# Patient Record
Sex: MALE | Race: WHITE | NOT HISPANIC OR LATINO | Employment: FULL TIME | URBAN - METROPOLITAN AREA
[De-identification: names, ages, dates, MRNs, and addresses within clinical notes are randomized per-mention and may not be internally consistent; named-entity substitution may affect disease eponyms.]

---

## 2017-10-03 ENCOUNTER — OFFICE VISIT (OUTPATIENT)
Dept: FAMILY MEDICINE | Facility: CLINIC | Age: 22
End: 2017-10-03
Payer: COMMERCIAL

## 2017-10-03 VITALS
WEIGHT: 204 LBS | SYSTOLIC BLOOD PRESSURE: 112 MMHG | HEART RATE: 64 BPM | HEIGHT: 77 IN | BODY MASS INDEX: 24.09 KG/M2 | TEMPERATURE: 98 F | DIASTOLIC BLOOD PRESSURE: 68 MMHG

## 2017-10-03 DIAGNOSIS — E80.6 HYPERBILIRUBINEMIA: Primary | ICD-10-CM

## 2017-10-03 DIAGNOSIS — Z00.00 ANNUAL PHYSICAL EXAM: ICD-10-CM

## 2017-10-03 DIAGNOSIS — Z13.6 CARDIOVASCULAR SCREENING; LDL GOAL LESS THAN 160: ICD-10-CM

## 2017-10-03 PROCEDURE — 99395 PREV VISIT EST AGE 18-39: CPT | Performed by: FAMILY MEDICINE

## 2017-10-03 NOTE — PROGRESS NOTES
SUBJECTIVE:   CC: Brandon Valdez is an 22 year old male who presents for preventative health visit.     Healthy Habits:    Do you get at least three servings of calcium containing foods daily (dairy, green leafy vegetables, etc.)? yes    Amount of exercise or daily activities, outside of work: 3 day(s) per week    Problems taking medications regularly not applicable    Medication side effects: No    Have you had an eye exam in the past two years? no    Do you see a dentist twice per year? yes    Do you have sleep apnea, excessive snoring or daytime drowsiness?no    History of hyperbilirubinemia. He has been evaluated by hematologist as well as hepatologist. He denies any new symptoms. According to him he had some blood work done recently.            Today's PHQ-2 Score:   PHQ-2 ( 1999 Pfizer) 10/3/2017 9/22/2016   Q1: Little interest or pleasure in doing things 0 0   Q2: Feeling down, depressed or hopeless 0 0   PHQ-2 Score 0 0       Abuse: Current or Past(Physical, Sexual or Emotional)- No  Do you feel safe in your environment - Yes    Social History   Substance Use Topics     Smoking status: Never Smoker     Smokeless tobacco: Never Used     Alcohol use 0.0 oz/week     0 Standard drinks or equivalent per week      Comment: 2-3 / week     The patient does not drink >3 drinks per day nor >7 drinks per week.    Last PSA: No results found for: PSA    Reviewed orders with patient. Reviewed health maintenance and updated orders accordingly - Yes      Reviewed and updated as needed this visit by clinical staff  Tobacco  Allergies  Meds  Soc Hx        Reviewed and updated as needed this visit by Provider            ROS:  C: NEGATIVE for fever, chills, change in weight  I: NEGATIVE for worrisome rashes, moles or lesions  E: NEGATIVE for vision changes or irritation  ENT: NEGATIVE for ear, mouth and throat problems  R: NEGATIVE for significant cough or SOB  CV: NEGATIVE for chest pain, palpitations or peripheral edema  GI:  "NEGATIVE for nausea, abdominal pain, heartburn, or change in bowel habits   male: negative for dysuria, hematuria, decreased urinary stream, erectile dysfunction, urethral discharge  M: NEGATIVE for significant arthralgias or myalgia  N: NEGATIVE for weakness, dizziness or paresthesias  P: NEGATIVE for changes in mood or affect    OBJECTIVE:   /68  Pulse 64  Temp 98  F (36.7  C) (Tympanic)  Ht 6' 4.65\" (1.947 m)  Wt 204 lb (92.5 kg)  BMI 24.41 kg/m2  EXAM:  GENERAL: healthy, alert and no distress  EYES: Eyes grossly normal to inspection, PERRL and conjunctivae and sclerae normal  HENT: ear canals and TM's normal, nose and mouth without ulcers or lesions  NECK: no adenopathy, no asymmetry, masses, or scars and thyroid normal to palpation  RESP: lungs clear to auscultation - no rales, rhonchi or wheezes  CV: regular rate and rhythm, normal S1 S2, no S3 or S4, no murmur, click or rub, no peripheral edema and peripheral pulses strong  ABDOMEN: soft, nontender, no hepatosplenomegaly, no masses and bowel sounds normal  MS: no gross musculoskeletal defects noted, no edema  SKIN: no suspicious lesions or rashes  NEURO: Normal strength and tone, mentation intact and speech normal  PSYCH: mentation appears normal, affect normal/bright    ASSESSMENT/PLAN:   1. Annual physical exam  Discussed with the patient about his hyperbilirubinemia. Discussed with him he should get periodic blood work. Patient denied any blood work at this time.    2. Hyperbilirubinemia  Stable has seen specialists for further evaluation. According to patient so far all the blood work has been normal and he denied any new symptoms    3. CARDIOVASCULAR SCREENING; LDL GOAL LESS THAN 160        COUNSELING:  Reviewed preventive health counseling, as reflected in patient instructions       Regular exercise       Healthy diet/nutrition           reports that he has never smoked. He has never used smokeless tobacco.      Estimated body mass index is " "24.41 kg/(m^2) as calculated from the following:    Height as of this encounter: 6' 4.65\" (1.947 m).    Weight as of this encounter: 204 lb (92.5 kg).         Counseling Resources:  ATP IV Guidelines  Pooled Cohorts Equation Calculator  FRAX Risk Assessment  ICSI Preventive Guidelines  Dietary Guidelines for Americans, 2010  USDA's MyPlate  ASA Prophylaxis  Lung CA Screening    Jamie Kaminski MD  [facility]  "

## 2017-10-03 NOTE — NURSING NOTE
"Chief Complaint   Patient presents with     Physical     Not Fasting        Initial /68  Pulse 64  Temp 98  F (36.7  C) (Tympanic)  Ht 6' 4.65\" (1.947 m)  Wt 204 lb (92.5 kg)  BMI 24.41 kg/m2 Estimated body mass index is 24.41 kg/(m^2) as calculated from the following:    Height as of this encounter: 6' 4.65\" (1.947 m).    Weight as of this encounter: 204 lb (92.5 kg).  Medication Reconciliation: complete    No current outpatient prescriptions on file.       Alberto LANCASTER CMA  "

## 2017-10-03 NOTE — MR AVS SNAPSHOT
After Visit Summary   10/3/2017    Brandon Valdez    MRN: [id number]           Patient Information     Date Of Birth          1995        Visit Information        Provider Department      10/3/2017 3:40 PM Jamie Kaminski MD [facility]        Today's Diagnoses     Hyperbilirubinemia    -  1    Annual physical exam        CARDIOVASCULAR SCREENING; LDL GOAL LESS THAN 160          Care Instructions      Preventive Health Recommendations  Male Ages 18 - 25     Yearly exam:             See your health care provider every year in order to  o   Review health changes.   o   Discuss preventive care.    o   Review your medicines if your doctor has prescribed any.    You should be tested each year for STDs (sexually transmitted diseases).     Talk to your provider about cholesterol testing.      If you are at risk for diabetes, you should have a diabetes test (fasting glucose).    Shots: Get a flu shot each year. Get a tetanus shot every 10 years.     Nutrition:    Eat at least 5 servings of fruits and vegetables daily.     Eat whole-grain bread, whole-wheat pasta and brown rice instead of white grains and rice.     Talk to your provider about calcium and Vitamin D.     Lifestyle    Exercise for at least 150 minutes a week (30 minutes a day, 5 days a week). This will help you control your weight and prevent disease.     Limit alcohol to one drink per day.     No smoking.     Wear sunscreen to prevent skin cancer.     See your dentist every six months for an exam and cleaning.             Follow-ups after your visit        Your next 10 appointments already scheduled     Oct 03, 2017  3:40 PM CDT   PHYSICAL with Jamie Kaminski MD   [facility] ([facility])    [address] [place] [id number] [phone]              Who to contact     If you have questions or need follow up information about today's clinic visit or your schedule please contact  "[facility] [place] directly at [phone].  Normal or non-critical lab and imaging results will be communicated to you by MyChart, letter or phone within 4 business days after the clinic has received the results. If you do not hear from us within 7 days, please contact the clinic through MyChart or phone. If you have a critical or abnormal lab result, we will notify you by phone as soon as possible.  Submit refill requests through [employer] or call your pharmacy and they will forward the refill request to us. Please allow 3 business days for your refill to be completed.          Additional Information About Your Visit        [employer] Information     [employer] gives you secure access to your electronic health record. If you see a primary care provider, you can also send messages to your care team and make appointments. If you have questions, please call your primary care clinic.  If you do not have a primary care provider, please call [phone] and they will assist you.        Care EveryWhere ID     This is your Care EveryWhere ID. This could be used by other organizations to access your [place] medical records  [id number]        Your Vitals Were     Pulse Temperature Height BMI (Body Mass Index)          64 98  F (36.7  C) (Tympanic) 6' 4.65\" (1.947 m) 24.41 kg/m2         Blood Pressure from Last 3 Encounters:   10/03/17 112/68   09/22/16 110/68   12/26/15 136/61    Weight from Last 3 Encounters:   10/03/17 204 lb (92.5 kg)   09/22/16 199 lb (90.3 kg)   12/26/15 197 lb (89.4 kg)              Today, you had the following     No orders found for display       Primary Care Provider Office Phone # Fax #    Jamie Kaminski [phone]        [address]  [place] [address]        Equal Access to Services     [facility] ANTONIO AH: Keron ramoso Sogalina, waaxda luqadaha, qaybta kaalmada adeegyada, waxay shahram acosta. So [facility] [phone].    ATENCIÓN: Si habla " español, tiene a walker disposición servicios gratuitos de asistencia lingüística. Sacha grimaldo [phone].    We comply with applicable federal civil rights laws and [place] laws. We do not discriminate on the basis of race, color, national origin, age, disability, sex, sexual orientation, or gender identity.            Thank you!     Thank you for choosing [facility] [place]  for your care. Our goal is always to provide you with excellent care. Hearing back from our patients is one way we can continue to improve our services. Please take a few minutes to complete the written survey that you may receive in the mail after your visit with us. Thank you!             Your Updated Medication List - Protect others around you: Learn how to safely use, store and throw away your medicines at www.disposemymeds.org.      Notice  As of 10/3/2017  3:32 PM    You have not been prescribed any medications.

## 2018-02-28 ENCOUNTER — OFFICE VISIT (OUTPATIENT)
Dept: FAMILY MEDICINE | Facility: CLINIC | Age: 23
End: 2018-02-28
Payer: COMMERCIAL

## 2018-02-28 VITALS
BODY MASS INDEX: 24.21 KG/M2 | SYSTOLIC BLOOD PRESSURE: 114 MMHG | OXYGEN SATURATION: 99 % | DIASTOLIC BLOOD PRESSURE: 69 MMHG | WEIGHT: 205 LBS | HEIGHT: 77 IN | HEART RATE: 64 BPM | TEMPERATURE: 97.6 F | RESPIRATION RATE: 14 BRPM

## 2018-02-28 DIAGNOSIS — L24.9 IRRITANT CONTACT DERMATITIS, UNSPECIFIED TRIGGER: Primary | ICD-10-CM

## 2018-02-28 PROCEDURE — 99213 OFFICE O/P EST LOW 20 MIN: CPT | Performed by: FAMILY MEDICINE

## 2018-02-28 RX ORDER — TRIAMCINOLONE ACETONIDE 1 MG/G
CREAM TOPICAL
Qty: 30 G | Refills: 3 | Status: SHIPPED | OUTPATIENT
Start: 2018-02-28 | End: 2021-09-21

## 2018-02-28 NOTE — MR AVS SNAPSHOT
After Visit Summary   2/28/2018    Brandon Valdez    MRN: [id number]           Patient Information     Date Of Birth          1995        Visit Information        Provider Department      2/28/2018 3:00 PM Nolan Estes MD [facility]        Today's Diagnoses     Irritant contact dermatitis, unspecified trigger    -  1       Follow-ups after your visit        Follow-up notes from your care team     Return in about 1 year (around 2/28/2019) for Physical Exam.      Your next 10 appointments already scheduled     Feb 28, 2018  3:00 PM CST   Office Visit with Nolan Estes MD   [facility] ([facility])    [address] [place] [id number] [phone]           Bring a current list of meds and any records pertaining to this visit. For Physicals, please bring immunization records and any forms needing to be filled out. Please arrive 10 minutes early to complete paperwork.              Who to contact     If you have questions or need follow up information about today's clinic visit or your schedule please contact [facility] directly at [phone].  Normal or non-critical lab and imaging results will be communicated to you by MyChart, letter or phone within 4 business days after the clinic has received the results. If you do not hear from us within 7 days, please contact the clinic through [employer] or phone. If you have a critical or abnormal lab result, we will notify you by phone as soon as possible.  Submit refill requests through [employer] or call your pharmacy and they will forward the refill request to us. Please allow 3 business days for your refill to be completed.          Additional Information About Your Visit        MyChart Information     [employer] gives you secure access to your electronic health record. If you see a primary care provider, you can also send messages to your care team and make appointments. If  "you have questions, please call your primary care clinic.  If you do not have a primary care provider, please call [phone] and they will assist you.        Care EveryWhere ID     This is your Care EveryWhere ID. This could be used by other organizations to access your [place] medical records  [id number]        Your Vitals Were     Pulse Temperature Respirations Height Pulse Oximetry BMI (Body Mass Index)    64 97.6  F (36.4  C) (Tympanic) 14 6' 4.5\" (1.943 m) 99% 24.63 kg/m2       Blood Pressure from Last 3 Encounters:   02/28/18 114/69   10/03/17 112/68   09/22/16 110/68    Weight from Last 3 Encounters:   02/28/18 205 lb (93 kg)   10/03/17 204 lb (92.5 kg)   09/22/16 199 lb (90.3 kg)              Today, you had the following     No orders found for display         Today's Medication Changes          These changes are accurate as of 2/28/18  2:56 PM.  If you have any questions, ask your nurse or doctor.               Start taking these medicines.        Dose/Directions    triamcinolone 0.1 % cream   Commonly known as:  KENALOG   Used for:  Irritant contact dermatitis, unspecified trigger   Started by:  Nolan Estes MD        Apply sparingly to affected area twice daily for 14 days.   Quantity:  30 g   Refills:  3            Where to get your medicines      These medications were sent to [facility] [address] [place] [id number]     Phone:  [phone]     triamcinolone 0.1 % cream                Primary Care Provider Office Phone # Fax #    Jamie Kaminski [phone]       [address]  [place] [address]        Equal Access to Services     [facility]: Keron sibley Sogalina, waaxda luqadaha, qaybta kaalmada lamar, jeronimo acosta. [facility] [phone].    ATENCIÓN: Si habla español, tiene a walker disposición servicios gratuitos de asistencia lingüística. " Sacha grimaldo [phone].    We comply with applicable federal civil rights laws and [place] laws. We do not discriminate on the basis of race, color, national origin, age, disability, sex, sexual orientation, or gender identity.            Thank you!     Thank you for choosing [facility] [place]  for your care. Our goal is always to provide you with excellent care. Hearing back from our patients is one way we can continue to improve our services. Please take a few minutes to complete the written survey that you may receive in the mail after your visit with us. Thank you!             Your Updated Medication List - Protect others around you: Learn how to safely use, store and throw away your medicines at www.disposemymeds.org.          This list is accurate as of 2/28/18  2:56 PM.  Always use your most recent med list.                   Brand Name Dispense Instructions for use Diagnosis    triamcinolone 0.1 % cream    KENALOG    30 g    Apply sparingly to affected area twice daily for 14 days.    Irritant contact dermatitis, unspecified trigger

## 2018-02-28 NOTE — PROGRESS NOTES
SUBJECTIVE:   Brandon Valdez is a 22 year old male who presents to clinic today for the following health issues:      Rash      Duration: 1 month     Description  Location: buttocks   Itching: moderate    Intensity:  moderate    Accompanying signs and symptoms: None    History (similar episodes/previous evaluation): None    Precipitating or alleviating factors:  New exposures:  None  Recent travel: no      Therapies tried and outcome: hydrocortisone cream -  not effective    Problem list and histories reviewed & adjusted, as indicated.  Additional history: as documented    Patient Active Problem List   Diagnosis     CARDIOVASCULAR SCREENING; LDL GOAL LESS THAN 160     Icterus     Hyperbilirubinemia     Past Surgical History:   Procedure Laterality Date     NO HISTORY OF SURGERY         Social History   Substance Use Topics     Smoking status: Never Smoker     Smokeless tobacco: Never Used     Alcohol use 0.0 oz/week     0 Standard drinks or equivalent per week      Comment: 2-3 / week     Family History   Problem Relation Age of Onset     CANCER Paternal Grandmother      lung (was a smoker)     CANCER Paternal Grandfather      prostate         Current Outpatient Prescriptions   Medication Sig Dispense Refill     triamcinolone (KENALOG) 0.1 % cream Apply sparingly to affected area twice daily for 14 days. 30 g 3     Allergies   Allergen Reactions     Nkda [No Known Drug Allergies]      Recent Labs   Lab Test  09/22/16   1618  01/07/14   1059   LDL  42  47   HDL  40  32*   TRIG  69  75   ALT  21  23   CR  0.95  0.92   GFRESTIMATED  >90  Non  GFR Calc    >90   GFRESTBLACK  >90   GFR Calc    >90   POTASSIUM  4.1  4.3   TSH  1.08   --       BP Readings from Last 3 Encounters:   02/28/18 114/69   10/03/17 112/68   09/22/16 110/68    Wt Readings from Last 3 Encounters:   02/28/18 205 lb (93 kg)   10/03/17 204 lb (92.5 kg)   09/22/16 199 lb (90.3 kg)                    Reviewed and updated as  "needed this visit by clinical staff       Reviewed and updated as needed this visit by Provider         ROS:  Constitutional, HEENT, cardiovascular, pulmonary, gi and gu systems are negative, except as otherwise noted.    OBJECTIVE:     /69 (Cuff Size: Adult Regular)  Pulse 64  Temp 97.6  F (36.4  C) (Tympanic)  Resp 14  Ht 6' 4.5\" (1.943 m)  Wt 205 lb (93 kg)  SpO2 99%  BMI 24.63 kg/m2  Body mass index is 24.63 kg/(m^2).  GENERAL: healthy, alert and no distress  NECK: no adenopathy, no asymmetry, masses, or scars and thyroid normal to palpation  RESP: lungs clear to auscultation - no rales, rhonchi or wheezes  CV: regular rate and rhythm, normal S1 S2, no S3 or S4, no murmur, click or rub, no peripheral edema and peripheral pulses strong  ABDOMEN: soft, nontender, no hepatosplenomegaly, no masses and bowel sounds normal  MS: no gross musculoskeletal defects noted, no edema        ASSESSMENT/PLAN:   Brandon was seen today for derm problem.    Diagnoses and all orders for this visit:    Irritant contact dermatitis, unspecified trigger  -     triamcinolone (KENALOG) 0.1 % cream; Apply sparingly to affected area twice daily for 14 days.      On sacral area with macular rash for last 2 weeks  Encouraged hygiene and dryness  Will have him to try kenalog and diaper rash cream as needed     Nolan Estes MD  [facility]    "

## 2018-02-28 NOTE — NURSING NOTE
"Chief Complaint   Patient presents with     Derm Problem       Initial /69 (Cuff Size: Adult Regular)  Pulse 64  Temp 97.6  F (36.4  C) (Tympanic)  Resp 14  Ht 6' 4.5\" (1.943 m)  Wt 205 lb (93 kg)  SpO2 99%  BMI 24.63 kg/m2 Estimated body mass index is 24.63 kg/(m^2) as calculated from the following:    Height as of this encounter: 6' 4.5\" (1.943 m).    Weight as of this encounter: 205 lb (93 kg).  Medication Reconciliation: complete   Jayleen Hart, CMA    "

## 2018-10-23 ENCOUNTER — OFFICE VISIT (OUTPATIENT)
Dept: FAMILY MEDICINE | Facility: CLINIC | Age: 23
End: 2018-10-23
Payer: COMMERCIAL

## 2018-10-23 VITALS
BODY MASS INDEX: 24.91 KG/M2 | DIASTOLIC BLOOD PRESSURE: 70 MMHG | WEIGHT: 211 LBS | TEMPERATURE: 97.1 F | HEART RATE: 68 BPM | HEIGHT: 77 IN | SYSTOLIC BLOOD PRESSURE: 114 MMHG

## 2018-10-23 DIAGNOSIS — Z11.4 SCREENING FOR HIV (HUMAN IMMUNODEFICIENCY VIRUS): ICD-10-CM

## 2018-10-23 DIAGNOSIS — Z00.00 ANNUAL PHYSICAL EXAM: ICD-10-CM

## 2018-10-23 DIAGNOSIS — R17 ICTERUS: ICD-10-CM

## 2018-10-23 DIAGNOSIS — Z13.6 CARDIOVASCULAR SCREENING; LDL GOAL LESS THAN 160: Primary | ICD-10-CM

## 2018-10-23 DIAGNOSIS — E80.6 HYPERBILIRUBINEMIA: ICD-10-CM

## 2018-10-23 DIAGNOSIS — Z11.3 SCREENING EXAMINATION FOR VENEREAL DISEASE: ICD-10-CM

## 2018-10-23 LAB
ALBUMIN SERPL-MCNC: 4.4 G/DL (ref 3.4–5)
ALP SERPL-CCNC: 62 U/L (ref 40–150)
ALT SERPL W P-5'-P-CCNC: 37 U/L (ref 0–70)
ANION GAP SERPL CALCULATED.3IONS-SCNC: 6 MMOL/L (ref 3–14)
AST SERPL W P-5'-P-CCNC: 23 U/L (ref 0–45)
BILIRUB SERPL-MCNC: 2.3 MG/DL (ref 0.2–1.3)
BUN SERPL-MCNC: 22 MG/DL (ref 7–30)
CALCIUM SERPL-MCNC: 9.6 MG/DL (ref 8.5–10.1)
CHLORIDE SERPL-SCNC: 105 MMOL/L (ref 94–109)
CO2 SERPL-SCNC: 28 MMOL/L (ref 20–32)
CREAT SERPL-MCNC: 0.9 MG/DL (ref 0.66–1.25)
GFR SERPL CREATININE-BSD FRML MDRD: >90 ML/MIN/1.7M2
GLUCOSE SERPL-MCNC: 97 MG/DL (ref 70–99)
HIV 1+2 AB+HIV1 P24 AG SERPL QL IA: NONREACTIVE
POTASSIUM SERPL-SCNC: 4.8 MMOL/L (ref 3.4–5.3)
PROT SERPL-MCNC: 7.9 G/DL (ref 6.8–8.8)
SODIUM SERPL-SCNC: 139 MMOL/L (ref 133–144)

## 2018-10-23 PROCEDURE — 36415 COLL VENOUS BLD VENIPUNCTURE: CPT | Performed by: FAMILY MEDICINE

## 2018-10-23 PROCEDURE — 87389 HIV-1 AG W/HIV-1&-2 AB AG IA: CPT | Performed by: FAMILY MEDICINE

## 2018-10-23 PROCEDURE — 80053 COMPREHEN METABOLIC PANEL: CPT | Performed by: FAMILY MEDICINE

## 2018-10-23 PROCEDURE — 99395 PREV VISIT EST AGE 18-39: CPT | Performed by: FAMILY MEDICINE

## 2018-10-23 NOTE — PROGRESS NOTES
SUBJECTIVE:   CC: Brandon Valdez is an 23 year old male who presents for preventative health visit.     Healthy Habits:    Do you get at least three servings of calcium containing foods daily (dairy, green leafy vegetables, etc.)? yes    Amount of exercise or daily activities, outside of work: 3-4 day(s) per week    Problems taking medications regularly not applicable    Medication side effects: No    Have you had an eye exam in the past two years? no    Do you see a dentist twice per year? yes    Do you have sleep apnea, excessive snoring or daytime drowsiness?no       No new concerns.  He has no history of hyperbilirubinemia and icterus.  He has been evaluated by hematology as well as pathology.  His last bilirubin was at 3.6.  He denies any new concerns no fatigue no tiredness    Today's PHQ-2 Score:   PHQ-2 ( 1999 Pfizer) 10/23/2018 2/28/2018   Q1: Little interest or pleasure in doing things 0 0   Q2: Feeling down, depressed or hopeless 0 0   PHQ-2 Score 0 0       Abuse: Current or Past(Physical, Sexual or Emotional)- NO  Do you feel safe in your environment - Yes    Social History   Substance Use Topics     Smoking status: Never Smoker     Smokeless tobacco: Never Used     Alcohol use 0.0 oz/week     0 Standard drinks or equivalent per week      Comment: 2-3 / week      If you drink alcohol do you typically have >3 drinks per day or >7 drinks per week? No                      Last PSA: No results found for: PSA    Reviewed orders with patient. Reviewed health maintenance and updated orders accordingly - Yes      Reviewed and updated as needed this visit by clinical staff  Tobacco  Allergies  Meds  Fam Hx  Soc Hx        Reviewed and updated as needed this visit by Provider            ROS:  CONSTITUTIONAL: NEGATIVE for fever, chills, change in weight  INTEGUMENTARY/SKIN: NEGATIVE for worrisome rashes, moles or lesions  EYES: NEGATIVE for vision changes or irritation  ENT: NEGATIVE for ear, mouth and throat  "problems  RESP: NEGATIVE for significant cough or SOB  CV: NEGATIVE for chest pain, palpitations or peripheral edema  GI: NEGATIVE for nausea, abdominal pain, heartburn, or change in bowel habits   male: negative for dysuria, hematuria, decreased urinary stream, erectile dysfunction, urethral discharge  MUSCULOSKELETAL: NEGATIVE for significant arthralgias or myalgia  NEURO: NEGATIVE for weakness, dizziness or paresthesias  PSYCHIATRIC: NEGATIVE for changes in mood or affect    OBJECTIVE:   /70  Pulse 68  Temp 97.1  F (36.2  C) (Tympanic)  Ht 6' 5\" (1.956 m)  Wt 211 lb (95.7 kg)  BMI 25.02 kg/m2  EXAM:  GENERAL: healthy, alert and no distress  EYES: Eyes grossly normal to inspection and icterus noted.  HENT: ear canals and TM's normal, nose and mouth without ulcers or lesions  NECK: no adenopathy, no asymmetry, masses, or scars and thyroid normal to palpation  RESP: lungs clear to auscultation - no rales, rhonchi or wheezes  CV: regular rate and rhythm, normal S1 S2, no S3 or S4, no murmur, click or rub, no peripheral edema and peripheral pulses strong  ABDOMEN: soft, nontender, no hepatosplenomegaly, no masses and bowel sounds normal  MS: no gross musculoskeletal defects noted, no edema  SKIN: no suspicious lesions or rashes  NEURO: Normal strength and tone, mentation intact and speech normal  PSYCH: mentation appears normal, affect normal/bright        ASSESSMENT/PLAN:   1. Annual physical exam    - HIV Screening  - Comprehensive metabolic panel        3. Screening for HIV (human immunodeficiency virus)      4. CARDIOVASCULAR SCREENING; LDL GOAL LESS THAN 160    - Comprehensive metabolic panel    5. Icterus  Labs ordered.  Once done we will follow-up on that.  If any worsening I would suggest him to follow-up with dermatology for further evaluation  - Comprehensive metabolic panel    6. Hyperbilirubinemia    - Comprehensive metabolic panel    COUNSELING:  Reviewed preventive health counseling, as " "reflected in patient instructions       Regular exercise       Healthy diet/nutrition    BP Readings from Last 1 Encounters:   10/23/18 114/70     Estimated body mass index is 25.02 kg/(m^2) as calculated from the following:    Height as of this encounter: 6' 5\" (1.956 m).    Weight as of this encounter: 211 lb (95.7 kg).           reports that he has never smoked. He has never used smokeless tobacco.      Counseling Resources:  ATP IV Guidelines  Pooled Cohorts Equation Calculator  FRAX Risk Assessment  ICSI Preventive Guidelines  Dietary Guidelines for Americans, 2010  USDA's MyPlate  ASA Prophylaxis  Lung CA Screening    Jamie Kaminski MD  [facility]  "

## 2018-10-23 NOTE — MR AVS SNAPSHOT
After Visit Summary   10/23/2018    Brandon Valdez    MRN: [id number]           Patient Information     Date Of Birth          1995        Visit Information        Provider Department      10/23/2018 8:20 AM Jamie Kaminski MD [facility] [place]        Today's Diagnoses     CARDIOVASCULAR SCREENING; LDL GOAL LESS THAN 160    -  1    Annual physical exam        Screening examination for venereal disease        Screening for HIV (human immunodeficiency virus)        Icterus        Hyperbilirubinemia          Care Instructions      Preventive Health Recommendations  Male Ages 21 - 25     Yearly exam:             See your health care provider every year in order to  o   Review health changes.   o   Discuss preventive care.    o   Review your medicines if your doctor has prescribed any.    You should be tested each year for STDs (sexually transmitted diseases).     Talk to your provider about cholesterol testing.      If you are at risk for diabetes, you should have a diabetes test (fasting glucose).    Shots: Get a flu shot each year. Get a tetanus shot every 10 years.     Nutrition:    Eat at least 5 servings of fruits and vegetables daily.     Eat whole-grain bread, whole-wheat pasta and brown rice instead of white grains and rice.     Get adequate calcium and Vitamin D.     Lifestyle    Exercise for at least 150 minutes a week (30 minutes a day, 5 days a week). This will help you control your weight and prevent disease.     Limit alcohol to one drink per day.     No smoking.     Wear sunscreen to prevent skin cancer.     See your dentist every six months for an exam and cleaning.             Follow-ups after your visit        Follow-up notes from your care team     Return in about 1 year (around 10/23/2019) for Physical Exam.      Who to contact     If you have questions or need follow up information about today's clinic visit or your schedule please contact [facility] [place] directly  "at [phone].  Normal or non-critical lab and imaging results will be communicated to you by MyChart, letter or phone within 4 business days after the clinic has received the results. If you do not hear from us within 7 days, please contact the clinic through [employer] or phone. If you have a critical or abnormal lab result, we will notify you by phone as soon as possible.  Submit refill requests through [employer] or call your pharmacy and they will forward the refill request to us. Please allow 3 business days for your refill to be completed.          Additional Information About Your Visit        vivithart Information     [employer] gives you secure access to your electronic health record. If you see a primary care provider, you can also send messages to your care team and make appointments. If you have questions, please call your primary care clinic.  If you do not have a primary care provider, please call [phone] and they will assist you.        Care EveryWhere ID     This is your Care EveryWhere ID. This could be used by other organizations to access your [place] medical records  [id number]        Your Vitals Were     Pulse Temperature Height BMI (Body Mass Index)          68 97.1  F (36.2  C) (Tympanic) 6' 5\" (1.956 m) 25.02 kg/m2         Blood Pressure from Last 3 Encounters:   10/23/18 114/70   02/28/18 114/69   10/03/17 112/68    Weight from Last 3 Encounters:   10/23/18 211 lb (95.7 kg)   02/28/18 205 lb (93 kg)   10/03/17 204 lb (92.5 kg)              We Performed the Following     Comprehensive metabolic panel     HIV Screening        Primary Care Provider Office Phone # Fax #    Jamie Kaminski [phone]       [address]  [place] [address]        Equal Access to Services     [facility] ANTONIO : Hadii daryn Bui, wagoranda tonio, qaybta kaalmamichelle newton, jeronimo acosta. So [facility] [phone].    ATENCIÓN: Si carmelola español, aleja a walker " disposición servicios gratuitos de asistencia lingüística. Sacha grimaldo [phone].    We comply with applicable federal civil rights laws and [place] laws. We do not discriminate on the basis of race, color, national origin, age, disability, sex, sexual orientation, or gender identity.            Thank you!     Thank you for choosing [facility] [place]  for your care. Our goal is always to provide you with excellent care. Hearing back from our patients is one way we can continue to improve our services. Please take a few minutes to complete the written survey that you may receive in the mail after your visit with us. Thank you!             Your Updated Medication List - Protect others around you: Learn how to safely use, store and throw away your medicines at www.disposemymeds.org.          This list is accurate as of 10/23/18  8:52 AM.  Always use your most recent med list.                   Brand Name Dispense Instructions for use Diagnosis    triamcinolone 0.1 % cream    KENALOG    30 g    Apply sparingly to affected area twice daily for 14 days.    Irritant contact dermatitis, unspecified trigger

## 2019-10-03 ENCOUNTER — OFFICE VISIT (OUTPATIENT)
Dept: FAMILY MEDICINE | Facility: CLINIC | Age: 24
End: 2019-10-03
Payer: COMMERCIAL

## 2019-10-03 VITALS
OXYGEN SATURATION: 97 % | TEMPERATURE: 98.3 F | HEIGHT: 77 IN | WEIGHT: 206 LBS | DIASTOLIC BLOOD PRESSURE: 60 MMHG | SYSTOLIC BLOOD PRESSURE: 100 MMHG | HEART RATE: 56 BPM | BODY MASS INDEX: 24.32 KG/M2

## 2019-10-03 DIAGNOSIS — Z13.6 CARDIOVASCULAR SCREENING; LDL GOAL LESS THAN 160: ICD-10-CM

## 2019-10-03 DIAGNOSIS — Z00.00 ROUTINE GENERAL MEDICAL EXAMINATION AT A HEALTH CARE FACILITY: Primary | ICD-10-CM

## 2019-10-03 DIAGNOSIS — R17 ICTERUS: ICD-10-CM

## 2019-10-03 DIAGNOSIS — E80.6 HYPERBILIRUBINEMIA: ICD-10-CM

## 2019-10-03 PROCEDURE — 80061 LIPID PANEL: CPT | Performed by: FAMILY MEDICINE

## 2019-10-03 PROCEDURE — 80053 COMPREHEN METABOLIC PANEL: CPT | Performed by: FAMILY MEDICINE

## 2019-10-03 PROCEDURE — 99214 OFFICE O/P EST MOD 30 MIN: CPT | Performed by: FAMILY MEDICINE

## 2019-10-03 PROCEDURE — 36415 COLL VENOUS BLD VENIPUNCTURE: CPT | Performed by: FAMILY MEDICINE

## 2019-10-03 SDOH — HEALTH STABILITY: MENTAL HEALTH: HOW MANY STANDARD DRINKS CONTAINING ALCOHOL DO YOU HAVE ON A TYPICAL DAY?: 3 OR 4

## 2019-10-03 SDOH — HEALTH STABILITY: MENTAL HEALTH: HOW OFTEN DO YOU HAVE A DRINK CONTAINING ALCOHOL?: 2-4 TIMES A MONTH

## 2019-10-03 ASSESSMENT — MIFFLIN-ST. JEOR: SCORE: 2030.04

## 2019-10-03 NOTE — PROGRESS NOTES
SUBJECTIVE:   CC: Brandon Valdez is an 24 year old male who presents for preventive health visit.     Healthy Habits:    Do you get at least three servings of calcium containing foods daily (dairy, green leafy vegetables, etc.)? yes    Amount of exercise or daily activities, outside of work: 3 day(s) per week    Problems taking medications regularly not applicable    Medication side effects: No    Have you had an eye exam in the past two years? no    Do you see a dentist twice per year? yes    Do you have sleep apnea, excessive snoring or daytime drowsiness?no      Patient has history of hyperbilirubinemia and some icterus.  He has been evaluated in the past from hepatology.  He was advised monitoring.  He denies any symptoms.    Today's PHQ-2 Score:   PHQ-2 ( 1999 Pfizer) 10/3/2019 10/23/2018   Q1: Little interest or pleasure in doing things 0 0   Q2: Feeling down, depressed or hopeless 0 0   PHQ-2 Score 0 0       Abuse: Current or Past(Physical, Sexual or Emotional)- No  Do you feel safe in your environment? Yes    Social History     Tobacco Use     Smoking status: Never Smoker     Smokeless tobacco: Never Used   Substance Use Topics     Alcohol use: Yes     Alcohol/week: 0.0 standard drinks     Frequency: 2-4 times a month     Drinks per session: 3 or 4     If you drink alcohol do you typically have >3 drinks per day or >7 drinks per week? No                      Last PSA: No results found for: PSA    Reviewed orders with patient. Reviewed health maintenance and updated orders accordingly - Yes  Lab work is in process    Reviewed and updated as needed this visit by clinical staff  Tobacco  Allergies  Meds  Fam Hx  Soc Hx        Reviewed and updated as needed this visit by Provider            ROS:  CONSTITUTIONAL: NEGATIVE for fever, chills, change in weight  INTEGUMENTARY/SKIN: NEGATIVE for worrisome rashes, moles or lesions  EYES: NEGATIVE for vision changes or irritation  ENT: NEGATIVE for ear, mouth and  "throat problems  RESP: NEGATIVE for significant cough or SOB  CV: NEGATIVE for chest pain, palpitations or peripheral edema  GI: NEGATIVE for nausea, abdominal pain, heartburn, or change in bowel habits   male: negative for dysuria, hematuria, decreased urinary stream, erectile dysfunction, urethral discharge  MUSCULOSKELETAL: NEGATIVE for significant arthralgias or myalgia  NEURO: NEGATIVE for weakness, dizziness or paresthesias  PSYCHIATRIC: NEGATIVE for changes in mood or affect    OBJECTIVE:   /60   Pulse 56   Temp 98.3  F (36.8  C) (Tympanic)   Ht 1.945 m (6' 4.58\")   Wt 93.4 kg (206 lb)   SpO2 97%   BMI 24.70 kg/m    EXAM:  GENERAL: healthy, alert and no distress  EYES: Eyes grossly normal to inspection, PERRL and conjunctivae and sclerae normal  HENT: ear canals and TM's normal, nose and mouth without ulcers or lesions  NECK: no adenopathy, no asymmetry, masses, or scars and thyroid normal to palpation  RESP: lungs clear to auscultation - no rales, rhonchi or wheezes  CV: regular rate and rhythm, normal S1 S2, no S3 or S4, no murmur, click or rub, no peripheral edema and peripheral pulses strong  ABDOMEN: soft, nontender, no hepatosplenomegaly, no masses and bowel sounds normal   Mild icterus noted.  MS: no gross musculoskeletal defects noted, no edema  SKIN: no suspicious lesions or rashes  NEURO: Normal strength and tone, mentation intact and speech normal  PSYCH: mentation appears normal, affect normal/bright        ASSESSMENT/PLAN:   1. Routine general medical examination at a health care facility    - Comprehensive metabolic panel  - Lipid panel reflex to direct LDL Fasting    2. Icterus  No current symptoms.  We will continue to monitor bilirubin and follow-up if any further testing is needed.  We discussed briefly elevated we may need to do ultrasound.  - Comprehensive metabolic panel  - Lipid panel reflex to direct LDL Fasting    3. Hyperbilirubinemia    - Comprehensive metabolic " "panel  - Lipid panel reflex to direct LDL Fasting    4. CARDIOVASCULAR SCREENING; LDL GOAL LESS THAN 160        COUNSELING:  Reviewed preventive health counseling, as reflected in patient instructions       Regular exercise       Healthy diet/nutrition    Estimated body mass index is 24.7 kg/m  as calculated from the following:    Height as of this encounter: 1.945 m (6' 4.58\").    Weight as of this encounter: 93.4 kg (206 lb).         reports that he has never smoked. He has never used smokeless tobacco.      Counseling Resources:  ATP IV Guidelines  Pooled Cohorts Equation Calculator  FRAX Risk Assessment  ICSI Preventive Guidelines  Dietary Guidelines for Americans, 2010  USDA's MyPlate  ASA Prophylaxis  Lung CA Screening    Jamie Kaminski MD  [facility] [place]  "

## 2019-10-04 LAB
ALBUMIN SERPL-MCNC: 4.5 G/DL (ref 3.4–5)
ALP SERPL-CCNC: 59 U/L (ref 40–150)
ALT SERPL W P-5'-P-CCNC: 21 U/L (ref 0–70)
ANION GAP SERPL CALCULATED.3IONS-SCNC: 2 MMOL/L (ref 3–14)
AST SERPL W P-5'-P-CCNC: 15 U/L (ref 0–45)
BILIRUB SERPL-MCNC: 2.6 MG/DL (ref 0.2–1.3)
BUN SERPL-MCNC: 17 MG/DL (ref 7–30)
CALCIUM SERPL-MCNC: 8.6 MG/DL (ref 8.5–10.1)
CHLORIDE SERPL-SCNC: 104 MMOL/L (ref 94–109)
CHOLEST SERPL-MCNC: 122 MG/DL
CO2 SERPL-SCNC: 31 MMOL/L (ref 20–32)
CREAT SERPL-MCNC: 1.01 MG/DL (ref 0.66–1.25)
GFR SERPL CREATININE-BSD FRML MDRD: >90 ML/MIN/{1.73_M2}
GLUCOSE SERPL-MCNC: 79 MG/DL (ref 70–99)
HDLC SERPL-MCNC: 43 MG/DL
LDLC SERPL CALC-MCNC: 60 MG/DL
NONHDLC SERPL-MCNC: 79 MG/DL
POTASSIUM SERPL-SCNC: 4.5 MMOL/L (ref 3.4–5.3)
PROT SERPL-MCNC: 7.2 G/DL (ref 6.8–8.8)
SODIUM SERPL-SCNC: 137 MMOL/L (ref 133–144)
TRIGL SERPL-MCNC: 97 MG/DL

## 2020-02-23 ENCOUNTER — HEALTH MAINTENANCE LETTER (OUTPATIENT)
Age: 25
End: 2020-02-23

## 2020-12-06 ENCOUNTER — HEALTH MAINTENANCE LETTER (OUTPATIENT)
Age: 25
End: 2020-12-06

## 2021-04-11 ENCOUNTER — HEALTH MAINTENANCE LETTER (OUTPATIENT)
Age: 26
End: 2021-04-11

## 2021-09-14 ASSESSMENT — ENCOUNTER SYMPTOMS
MYALGIAS: 0
FREQUENCY: 0
PARESTHESIAS: 0
CHILLS: 0
HEARTBURN: 0
HEMATURIA: 0
COUGH: 0
DIZZINESS: 0
PALPITATIONS: 0
HEMATOCHEZIA: 0
NAUSEA: 0
EYE PAIN: 0
HEADACHES: 0
ARTHRALGIAS: 0
FEVER: 0
WEAKNESS: 0
ABDOMINAL PAIN: 0
JOINT SWELLING: 0
SORE THROAT: 0
CONSTIPATION: 0
SHORTNESS OF BREATH: 0
DIARRHEA: 0
NERVOUS/ANXIOUS: 0
DYSURIA: 0

## 2021-09-21 ENCOUNTER — OFFICE VISIT (OUTPATIENT)
Dept: FAMILY MEDICINE | Facility: CLINIC | Age: 26
End: 2021-09-21
Payer: COMMERCIAL

## 2021-09-21 VITALS
BODY MASS INDEX: 24.18 KG/M2 | WEIGHT: 209 LBS | SYSTOLIC BLOOD PRESSURE: 120 MMHG | HEART RATE: 62 BPM | TEMPERATURE: 97.4 F | HEIGHT: 78 IN | RESPIRATION RATE: 16 BRPM | DIASTOLIC BLOOD PRESSURE: 80 MMHG | OXYGEN SATURATION: 100 %

## 2021-09-21 DIAGNOSIS — Z00.00 ROUTINE GENERAL MEDICAL EXAMINATION AT A HEALTH CARE FACILITY: Primary | ICD-10-CM

## 2021-09-21 DIAGNOSIS — E80.6 HYPERBILIRUBINEMIA: ICD-10-CM

## 2021-09-21 DIAGNOSIS — Z13.6 CARDIOVASCULAR SCREENING; LDL GOAL LESS THAN 160: ICD-10-CM

## 2021-09-21 DIAGNOSIS — Z11.59 NEED FOR HEPATITIS C SCREENING TEST: ICD-10-CM

## 2021-09-21 LAB
ALBUMIN SERPL-MCNC: 4.4 G/DL (ref 3.4–5)
ALP SERPL-CCNC: 52 U/L (ref 40–150)
ALT SERPL W P-5'-P-CCNC: 26 U/L (ref 0–70)
ANION GAP SERPL CALCULATED.3IONS-SCNC: 5 MMOL/L (ref 3–14)
AST SERPL W P-5'-P-CCNC: 15 U/L (ref 0–45)
BILIRUB SERPL-MCNC: 3.4 MG/DL (ref 0.2–1.3)
BUN SERPL-MCNC: 12 MG/DL (ref 7–30)
CALCIUM SERPL-MCNC: 9.3 MG/DL (ref 8.5–10.1)
CHLORIDE BLD-SCNC: 104 MMOL/L (ref 94–109)
CHOLEST SERPL-MCNC: 127 MG/DL
CO2 SERPL-SCNC: 27 MMOL/L (ref 20–32)
CREAT SERPL-MCNC: 0.9 MG/DL (ref 0.66–1.25)
FASTING STATUS PATIENT QL REPORTED: YES
GFR SERPL CREATININE-BSD FRML MDRD: >90 ML/MIN/1.73M2
GLUCOSE BLD-MCNC: 97 MG/DL (ref 70–99)
HCV AB SERPL QL IA: NONREACTIVE
HDLC SERPL-MCNC: 50 MG/DL
LDLC SERPL CALC-MCNC: 64 MG/DL
NONHDLC SERPL-MCNC: 77 MG/DL
POTASSIUM BLD-SCNC: 4.1 MMOL/L (ref 3.4–5.3)
PROT SERPL-MCNC: 7.7 G/DL (ref 6.8–8.8)
SODIUM SERPL-SCNC: 136 MMOL/L (ref 133–144)
TRIGL SERPL-MCNC: 64 MG/DL

## 2021-09-21 PROCEDURE — 80053 COMPREHEN METABOLIC PANEL: CPT | Performed by: FAMILY MEDICINE

## 2021-09-21 PROCEDURE — 36415 COLL VENOUS BLD VENIPUNCTURE: CPT | Performed by: FAMILY MEDICINE

## 2021-09-21 PROCEDURE — 99395 PREV VISIT EST AGE 18-39: CPT | Performed by: FAMILY MEDICINE

## 2021-09-21 PROCEDURE — 86803 HEPATITIS C AB TEST: CPT | Performed by: FAMILY MEDICINE

## 2021-09-21 PROCEDURE — 80061 LIPID PANEL: CPT | Performed by: FAMILY MEDICINE

## 2021-09-21 ASSESSMENT — ENCOUNTER SYMPTOMS
JOINT SWELLING: 0
NERVOUS/ANXIOUS: 0
PARESTHESIAS: 0
PALPITATIONS: 0
EYE PAIN: 0
MYALGIAS: 0
WEAKNESS: 0
DYSURIA: 0
FEVER: 0
CHILLS: 0
CONSTIPATION: 0
COUGH: 0
ARTHRALGIAS: 0
HEADACHES: 0
SORE THROAT: 0
HEMATURIA: 0
NAUSEA: 0
FREQUENCY: 0
ABDOMINAL PAIN: 0
HEARTBURN: 0
DIZZINESS: 0
DIARRHEA: 0
HEMATOCHEZIA: 0
SHORTNESS OF BREATH: 0

## 2021-09-21 ASSESSMENT — PAIN SCALES - GENERAL: PAINLEVEL: NO PAIN (0)

## 2021-09-21 ASSESSMENT — MIFFLIN-ST. JEOR: SCORE: 2048.33

## 2021-09-21 NOTE — PROGRESS NOTES
SUBJECTIVE:   CC: Brandon Valdez is an 26 year old male who presents for preventative health visit.       Patient has been advised of split billing requirements and indicates understanding: Yes  Healthy Habits:     Getting at least 3 servings of Calcium per day:  NO    Bi-annual eye exam:  NO    Dental care twice a year:  Yes    Sleep apnea or symptoms of sleep apnea:  None    Diet:  Breakfast skipped    Frequency of exercise:  2-3 days/week    Duration of exercise:  15-30 minutes    Taking medications regularly:  No    Barriers to taking medications:  None    Medication side effects:  Not applicable    PHQ-2 Total Score: 0    Additional concerns today:  No    Patient is overall very healthy.  He has hyperbilirubinemia which has been followed up in the past denies any concerns.  Overall doing exercise eat healthy.        Today's PHQ-2 Score:   PHQ-2 ( 1999 Pfizer) 9/14/2021   Q1: Little interest or pleasure in doing things 0   Q2: Feeling down, depressed or hopeless 0   PHQ-2 Score 0   Q1: Little interest or pleasure in doing things Not at all   Q2: Feeling down, depressed or hopeless Not at all   PHQ-2 Score 0       Abuse: Current or Past(Physical, Sexual or Emotional)- No  Do you feel safe in your environment? Yes    Have you ever done Advance Care Planning? (For example, a Health Directive, POLST, or a discussion with a medical provider or your loved ones about your wishes): No, advance care planning information given to patient to review.  Patient declined advance care planning discussion at this time.    Social History     Tobacco Use     Smoking status: Never Smoker     Smokeless tobacco: Never Used   Substance Use Topics     Alcohol use: Yes     Alcohol/week: 0.0 standard drinks     If you drink alcohol do you typically have >3 drinks per day or >7 drinks per week? No    Alcohol Use 9/21/2021   Prescreen: >3 drinks/day or >7 drinks/week? -   Prescreen: >3 drinks/day or >7 drinks/week? No       Last PSA: No  "results found for: PSA    Reviewed orders with patient. Reviewed health maintenance and updated orders accordingly - Yes  Lab work is in process      Reviewed and updated as needed this visit by clinical staff  Tobacco  Allergies  Meds   Med Hx  Surg Hx  Fam Hx  Soc Hx        Reviewed and updated as needed this visit by Provider                    Review of Systems   Constitutional: Negative for chills and fever.   HENT: Negative for congestion, ear pain, hearing loss and sore throat.    Eyes: Negative for pain and visual disturbance.   Respiratory: Negative for cough and shortness of breath.    Cardiovascular: Negative for chest pain, palpitations and peripheral edema.   Gastrointestinal: Negative for abdominal pain, constipation, diarrhea, heartburn, hematochezia and nausea.   Genitourinary: Negative for discharge, dysuria, frequency, genital sores, hematuria, impotence and urgency.   Musculoskeletal: Negative for arthralgias, joint swelling and myalgias.   Skin: Negative for rash.   Neurological: Negative for dizziness, weakness, headaches and paresthesias.   Psychiatric/Behavioral: Negative for mood changes. The patient is not nervous/anxious.          OBJECTIVE:   /80   Pulse 62   Temp 97.4  F (36.3  C)   Resp 16   Ht 1.969 m (6' 5.5\")   Wt 94.8 kg (209 lb)   SpO2 100%   BMI 24.47 kg/m      Physical Exam  GENERAL: healthy, alert and no distress  EYES: Eyes grossly normal to inspection, PERRL and conjunctivae and sclerae normal  HENT: ear canals and TM's normal, nose and mouth without ulcers or lesions  NECK: no adenopathy, no asymmetry, masses, or scars and thyroid normal to palpation  RESP: lungs clear to auscultation - no rales, rhonchi or wheezes  CV: regular rate and rhythm, normal S1 S2, no S3 or S4, no murmur, click or rub, no peripheral edema and peripheral pulses strong  ABDOMEN: soft, nontender, no hepatosplenomegaly, no masses and bowel sounds normal  MS: no gross musculoskeletal " "defects noted, no edema  SKIN: no suspicious lesions or rashes  NEURO: Normal strength and tone, mentation intact and speech normal  PSYCH: mentation appears normal, affect normal/bright    Diagnostic Test Results:  Labs reviewed in Epic    ASSESSMENT/PLAN:   Brandon was seen today for physical.    Diagnoses and all orders for this visit:    Routine general medical examination at a health care facility  -     Lipid Profile; Future  -     Lipid Profile    Need for hepatitis C screening test  -     Hepatitis C Screen Reflex to HCV RNA Quant and Genotype; Future  -     Hepatitis C Screen Reflex to HCV RNA Quant and Genotype    CARDIOVASCULAR SCREENING; LDL GOAL LESS THAN 160  -     Lipid Profile; Future  -     Lipid Profile    Hyperbilirubinemia  -     Comprehensive metabolic panel (BMP + Alb, Alk Phos, ALT, AST, Total. Bili, TP); Future  -     Comprehensive metabolic panel (BMP + Alb, Alk Phos, ALT, AST, Total. Bili, TP)  Labs ordered once done we will follow-up on that.  We discussed about causes of hyperbilirubinemia in his case it seems like congenital.  He does not have any symptoms due to that.  Other orders  -     REVIEW OF HEALTH MAINTENANCE PROTOCOL ORDERS        Patient has been advised of split billing requirements and indicates understanding: Yes  COUNSELING:   Reviewed preventive health counseling, as reflected in patient instructions       Regular exercise       Healthy diet/nutrition    Estimated body mass index is 24.47 kg/m  as calculated from the following:    Height as of this encounter: 1.969 m (6' 5.5\").    Weight as of this encounter: 94.8 kg (209 lb).         He reports that he has never smoked. He has never used smokeless tobacco.      Counseling Resources:  ATP IV Guidelines  Pooled Cohorts Equation Calculator  FRAX Risk Assessment  ICSI Preventive Guidelines  Dietary Guidelines for Americans, 2010  USDA's MyPlate  ASA Prophylaxis  Lung CA Screening    Jamie Kaminski MD  [facility] " [place]

## 2021-09-21 NOTE — LETTER
September 27, 2021      Brandon Valdez  [address] [place] [address]        Dear ,      I have reviewed your recent labs. Here are the results:     -Cholesterol levels (LDL,HDL, Triglycerides) are normal.  ADVISE: rechecking in 1 year.   -Hepatitis C antibody screen test shows no signs of a previous hepatitis C infection.   - liver function are over all normal, except your bilirubin is high, slight worse than before. Either we should continue to monitor or we can get you to see specialist.   Recheck in 6 months is reasonable option.        Resulted Orders   Hepatitis C Screen Reflex to HCV RNA Quant and Genotype   Result Value Ref Range    Hepatitis C Antibody Nonreactive Nonreactive    Narrative    Assay performance characteristics have not been established for newborns, infants, and children.   Lipid Profile   Result Value Ref Range    Cholesterol 127 <200 mg/dL    Triglycerides 64 <150 mg/dL    Direct Measure HDL 50 >=40 mg/dL    LDL Cholesterol Calculated 64 <=100 mg/dL    Non HDL Cholesterol 77 <130 mg/dL    Patient Fasting > 8hrs? Yes     Narrative    Cholesterol  Desirable:  <200 mg/dL    Triglycerides  Normal:  Less than 150 mg/dL  Borderline High:  150-199 mg/dL  High:  200-499 mg/dL  Very High:  Greater than or equal to 500 mg/dL    Direct Measure HDL  Female:  Greater than or equal to 50 mg/dL   Male:  Greater than or equal to 40 mg/dL    LDL Cholesterol  Desirable:  <100mg/dL  Above Desirable:  100-129 mg/dL   Borderline High:  130-159 mg/dL   High:  160-189 mg/dL   Very High:  >= 190 mg/dL    Non HDL Cholesterol  Desirable:  130 mg/dL  Above Desirable:  130-159 mg/dL  Borderline High:  160-189 mg/dL  High:  190-219 mg/dL  Very High:  Greater than or equal to 220 mg/dL   Comprehensive metabolic panel (BMP + Alb, Alk Phos, ALT, AST, Total. Bili, TP)   Result Value Ref Range    Sodium 136 133 - 144 mmol/L    Potassium 4.1 3.4 - 5.3 mmol/L    Chloride 104 94 - 109 mmol/L    Carbon Dioxide  (CO2) 27 20 - 32 mmol/L    Anion Gap 5 3 - 14 mmol/L    Urea Nitrogen 12 7 - 30 mg/dL    Creatinine 0.90 0.66 - 1.25 mg/dL    Calcium 9.3 8.5 - 10.1 mg/dL    Glucose 97 70 - 99 mg/dL    Alkaline Phosphatase 52 40 - 150 U/L    AST 15 0 - 45 U/L    ALT 26 0 - 70 U/L    Protein Total 7.7 6.8 - 8.8 g/dL    Albumin 4.4 3.4 - 5.0 g/dL    Bilirubin Total 3.4 (H) 0.2 - 1.3 mg/dL    GFR Estimate >90 >60 mL/min/1.73m2      Comment:      As of July 11, 2021, eGFR is calculated by the CKD-EPI creatinine equation, without race adjustment. eGFR can be influenced by muscle mass, exercise, and diet. The reported eGFR is an estimation only and is only applicable if the renal function is stable.       If you have any questions or concerns, please call the clinic at the number listed above.       Sincerely,      Jamie Kaminski MD

## 2021-09-26 ENCOUNTER — HEALTH MAINTENANCE LETTER (OUTPATIENT)
Age: 26
End: 2021-09-26

## 2022-01-16 ENCOUNTER — OFFICE VISIT (OUTPATIENT)
Dept: FAMILY MEDICINE | Facility: CLINIC | Age: 27
End: 2022-01-16
Payer: COMMERCIAL

## 2022-01-16 VITALS
DIASTOLIC BLOOD PRESSURE: 76 MMHG | OXYGEN SATURATION: 97 % | HEART RATE: 92 BPM | SYSTOLIC BLOOD PRESSURE: 130 MMHG | TEMPERATURE: 98.5 F

## 2022-01-16 DIAGNOSIS — S42.035A CLOSED NONDISPLACED FRACTURE OF ACROMIAL END OF LEFT CLAVICLE, INITIAL ENCOUNTER: Primary | ICD-10-CM

## 2022-01-16 DIAGNOSIS — S40.012A CONTUSION OF LEFT SHOULDER, INITIAL ENCOUNTER: ICD-10-CM

## 2022-01-16 PROCEDURE — 99213 OFFICE O/P EST LOW 20 MIN: CPT | Performed by: STUDENT IN AN ORGANIZED HEALTH CARE EDUCATION/TRAINING PROGRAM

## 2022-01-16 RX ORDER — FINASTERIDE 1 MG/1
1 TABLET, FILM COATED ORAL DAILY
COMMUNITY

## 2022-01-16 RX ORDER — IBUPROFEN 400 MG/1
400 TABLET, FILM COATED ORAL EVERY 6 HOURS PRN
Qty: 30 TABLET | Refills: 0 | Status: SHIPPED | OUTPATIENT
Start: 2022-01-16

## 2022-01-16 NOTE — PATIENT INSTRUCTIONS
Patient Education     Collarbone Fracture  You have a break (fracture) in your collarbone (clavicle). This will cause swelling, pain, and bruising. The first few weeks will be the most painful. This is because deep breathing, coughing, or changing position from sitting to lying down may cause the broken ends to move slightly.    The fracture will heal in about 4 to 6 weeks. Most people can return to normal activities in about 3 months. In children, this injury will heal often by reshaping the bone back to normal. In adults, a noticeable bump in the bone may remain.   Treatment is with a sling or a special type of arm sling called a shoulder immobilizer. This supports your arm and eases pain. Most fractures can be treated this way. But more complicated breaks may need surgery. This is done by an orthopedic surgeon, who specializes in treating bone, muscle, joint, and tendon problems.   Home care  Follow these guidelines when caring for yourself or your child at home:    Put an ice pack on the injured area. Do this for 20 minutes every 1 to 2 hours on the first day. You can make an ice pack by wrapping a plastic bag of ice cubes in a thin towel. Keep using the ice pack 3 to 4 times a day for the next 2 days. Then use it as needed to ease pain and swelling.    If you were given a sling or shoulder immobilizer, wear it for comfort. You may take it off when you bathe or sleep. Take your arm out of the sling for a little while each day and move your shoulder, elbow, wrist, and hand to keep them from getting stiff.    Don t do any heavy lifting or raise the injured arm overhead until you are pain-free. Your child shouldn t play sports or do physical education class for at least 4 weeks, or until the healthcare provider says it s OK to do so.    You may use acetaminophen or ibuprofen to control pain, unless another pain medicine was prescribed. If you have chronic liver or kidney disease, talk with your healthcare provider  before using these medicines. Also talk with your provider if you ve had a stomach ulcer or digestive bleeding.    Your doctor may refer you to physical therapy for shoulder exercises once it starts to heal.    You may need surgery if the bones are out of place (displaced). Surgery will put them in better alignment while they heal. This leads to better strength when you have healed.  Follow-up care  Follow up with your healthcare provider within 1 week, or as advised. This is to be sure the bone is healing the way it should.   X-rays are occasionally taken of the fracture. You will be told of any new findings that may affect your care.   When to seek medical advice  Call your healthcare provider right away if any of these occur:    Swelling in your collarbone gets worse or the skin in the area becomes pale or discolored    Large area of bruising over the collarbone    Fingers become swollen, cold, blue, numb, or tingly    Shortness of breath, dizziness, or general weakness    Weakness or swelling in your arm    Any redness, drainage, or pus coming from the wound  [employer] last reviewed this educational content on 9/1/2019 2000-2021 [employer]. All rights reserved. This information is not intended as a substitute for professional medical care. Always follow your healthcare professional's instructions.

## 2022-01-16 NOTE — PROGRESS NOTES
Assessment & Plan     Closed nondisplaced fracture of acromial end of left clavicle, initial encounter  Patient does have a closed nondisplaced oblique fracture in the distal left clavicle on shoulder x-ray.  Patient is recommended to make nonweightbearing on the left upper extremity, patient has a adult size sling from previous arm fracture that he will use to make nonweightbearing.  Recommended follow-up with his primary care provider or orthopedic clinic for repeat x-rays in 1 to 2 weeks. Don t do any heavy lifting or raise the injured arm overhead until you are pain-free. Ibuprofen and tylenol to take as needed for comfort.    Contusion of left shoulder, initial encounter  - XR Shoulder Left 2 Views; Future  - XR Shoulder Left 2 Views  - ibuprofen (ADVIL/MOTRIN) 400 MG tablet; Take 1 tablet (400 mg) by mouth every 6 hours as needed for moderate pain    Return if symptoms worsen or fail to improve.    Pj Baker MD  [facility] CIARA Taylor is a 26 year old who presents for the following health issues    HPI     Patient presents with acute left shoulder pain over the lateral shoulder after mechanical fall from snowboarding. He states that he fell with his full weight over his left shoulder and collarbone just a few hours prior to presenting to urgent care. He denies any radiating numbness, tingling, weakness in the hand, endorses complete range of motion except with pain. Denies any previous mechanical injuries over that left shoulder. Shoulder pain is elicited with movements especially with reaching down, denies any pain with extension of the arm above the shoulder.     Review of Systems   Constitutional, HEENT, cardiovascular, pulmonary, gi and gu systems are negative, except as otherwise noted.      Objective    /76 (BP Location: Right arm, Patient Position: Sitting, Cuff Size: Adult Large)   Pulse 92   Temp 98.5  F (36.9  C) (Tympanic)   SpO2 97%   There is no  height or weight on file to calculate BMI.  Physical Exam   GENERAL: healthy, alert and no distress  EYES: Eyes grossly normal to inspection, PERRL and conjunctivae and sclerae normal  HENT: Normocephalic, atraumatic, ear canals normal, nose and mouth without ulcers or lesions  SKIN: no suspicious lesions or rashes  NEURO: Normal strength and tone, mentation intact and speech normal  PSYCH: mentation appears normal, affect normal/bright  MS: no gross musculoskeletal defects noted, no edema  Inspection: asymmetry? No; dyskinesis? No  ROM:   Active - forward flexion - full/ abduction - full/ int rot - symmetric/ ext rot - symmetric   Passive- forward flexion - full/ abduction - full   Strength: deltoid/supraspinatous - 5/5; infraspinatous/ teres minor - 5/5; subscapularis - 5/5   Maneuvers:   RTC - empty can -positive; painful arc -positive;  Impingement - Michelle -neg; Neer- neg   AC - cross arm - neg   Biceps - Speed's - neg; Yergason's - neg   Palpation: AC - neg; Acromion/ supraspinatus - neg; scapula - neg; bicipital groove - neg     Results for orders placed or performed in visit on 01/16/22 (from the past 24 hour(s))   XR Shoulder Left 2 Views    Narrative    EXAM DATE:         01/16/2022    EXAM: X-RAY SHOULDER LEFT, MINIMUM 2 VIEWS  LOCATION: [facility]  DATE/TIME: 1/16/2022 2:45 PM    INDICATION: Contusion of left shoulder  COMPARISON: None.    IMPRESSION: Acute, nondisplaced oblique fracture of the distal clavicle, with fracture lucency centered approximately 2 cm from the AC joint. No radiographic evidence of AC joint extension. No AC joint subluxation.

## 2023-01-08 ENCOUNTER — HEALTH MAINTENANCE LETTER (OUTPATIENT)
Age: 28
End: 2023-01-08

## 2024-02-10 ENCOUNTER — HEALTH MAINTENANCE LETTER (OUTPATIENT)
Age: 29
End: 2024-02-10